# Patient Record
Sex: MALE | Race: WHITE | NOT HISPANIC OR LATINO | Employment: FULL TIME | URBAN - METROPOLITAN AREA
[De-identification: names, ages, dates, MRNs, and addresses within clinical notes are randomized per-mention and may not be internally consistent; named-entity substitution may affect disease eponyms.]

---

## 2022-04-07 ENCOUNTER — OFFICE VISIT (OUTPATIENT)
Dept: FAMILY MEDICINE CLINIC | Facility: CLINIC | Age: 27
End: 2022-04-07

## 2022-04-07 ENCOUNTER — PATIENT ROUNDING (BHMG ONLY) (OUTPATIENT)
Dept: FAMILY MEDICINE CLINIC | Facility: CLINIC | Age: 27
End: 2022-04-07

## 2022-04-07 VITALS
BODY MASS INDEX: 20.33 KG/M2 | HEIGHT: 73 IN | WEIGHT: 153.4 LBS | HEART RATE: 80 BPM | SYSTOLIC BLOOD PRESSURE: 140 MMHG | RESPIRATION RATE: 14 BRPM | TEMPERATURE: 97.3 F | DIASTOLIC BLOOD PRESSURE: 88 MMHG | OXYGEN SATURATION: 96 %

## 2022-04-07 DIAGNOSIS — F41.1 GAD (GENERALIZED ANXIETY DISORDER): ICD-10-CM

## 2022-04-07 DIAGNOSIS — F33.0 MILD EPISODE OF RECURRENT DEPRESSIVE DISORDER: ICD-10-CM

## 2022-04-07 DIAGNOSIS — F42.2 MIXED OBSESSIONAL THOUGHTS AND ACTS: ICD-10-CM

## 2022-04-07 DIAGNOSIS — R03.0 ELEVATED BLOOD PRESSURE READING: Primary | ICD-10-CM

## 2022-04-07 PROCEDURE — 99204 OFFICE O/P NEW MOD 45 MIN: CPT | Performed by: FAMILY MEDICINE

## 2022-04-07 RX ORDER — SERTRALINE HYDROCHLORIDE 25 MG/1
25 TABLET, FILM COATED ORAL DAILY
Qty: 30 TABLET | Refills: 1 | Status: SHIPPED | OUTPATIENT
Start: 2022-04-07 | End: 2022-05-12 | Stop reason: SDUPTHER

## 2022-04-07 NOTE — PROGRESS NOTES
Chief Complaint  Establish Care, chronic nausea x 3 months, and Anxiety    Subjective          Ramses Nguyen presents to [facility] PRIMARY [facility]  Anxiety  Presents for initial visit. Symptoms include depressed mood, excessive worry, nausea and nervous/anxious behavior. Patient reports no irritability, restlessness or suicidal ideas.       GI Problem  The primary symptoms include nausea. The illness began more than 7 days ago.     PHQ-2/PHQ-9 Depression Screening 2022   Little Interest or Pleasure in Doing Things 0-->not at all   Feeling Down, Depressed or Hopeless 3-->nearly every day   Trouble Falling or Staying Asleep, or Sleeping Too Much 1-->several days   Feeling Tired or Having Little Energy 1-->several days   Poor Appetite or Overeating 1-->several days   Feeling Bad about Yourself - or that You are a Failure or Have Let Yourself or Your Family Down 1-->several days   Trouble Concentrating on Things, Such as Reading the Newspaper or Watching Television 0-->not at all   Moving or Speaking So Slowly that Other People Could Have Noticed? Or the Opposite - Being So Fidgety 0-->not at all   Thoughts that You Would be Better Off Dead or of Hurting Yourself in Some Way 0-->not at all   PHQ-9: Brief Depression Severity Measure Score 7   If You Checked Off Any Problems, How Difficult Have These Problems Made It For You to Do Your Work, Take Care of Things at Home, or Get Along with Other People? somewhat difficult     MENDEL-7  Over the last two weeks, how often have you been bothered by the following problems?  Feeling nervous, anxious or on edge: 3  Not being able to stop or control worryin  Worrying too much about different things: 3  Trouble Relaxin  Being so restless that it is hard to sit still: 0  Becoming easily annoyed or irritable: 0  Feeling afraid as if something awful might happen: 3  MENDEL 7 Total Score: 11  If you checked any problems, how difficult have these problems made it for  "you to do your work, take care of things at home, or get along with other people: Somewhat difficult      Anxiety since his father passed away. He has been to counseling but was ineffective. Never on medications. Sometimes he has trouble sleeping, he gets nausea, and worked up. He gets stressed. Every now and then he has to leave work to check on doors locked, didn't leave stove on. Worried about getting fired at work. This morning he locked the door, jiggled handles several times, walked outside, then immediately walked back to check lock a second time. He fidgets his hands a lot a pretty bad tremors. Mother was on medications but it made her depressed. He is scared of medications. He sees counselor with [facility].     Nausea comes and goes by midday. Even if he hasn't eaten. He doesn't want to eat because he is nauseous.             Objective   Vital Signs:   /88   Pulse 80   Temp 97.3 °F (36.3 °C) (Infrared)   Resp 14   Ht 185.4 cm (73\")   Wt 69.6 kg (153 lb 6.4 oz)   SpO2 96%   BMI 20.24 kg/m²     BMI is within normal parameters. No follow-up required.      Physical Exam  Vitals reviewed.   Constitutional:       General: He is not in acute distress.     Appearance: He is not ill-appearing.   Cardiovascular:      Rate and Rhythm: Normal rate and regular rhythm.   Pulmonary:      Effort: Pulmonary effort is normal.      Breath sounds: Normal breath sounds.   Abdominal:      General: Bowel sounds are normal. There is no distension.      Palpations: Abdomen is soft. There is no hepatomegaly.      Tenderness: There is no abdominal tenderness. There is no guarding or rebound.   Neurological:      Mental Status: He is alert.   Psychiatric:         Attention and Perception: Attention normal.         Mood and Affect: Mood is anxious.         Behavior: Behavior is cooperative.        Result Review :                 Assessment and Plan    Diagnoses and all orders for this visit:    1. Elevated blood pressure " reading (Primary)  New.  No history of hypertension.  Possibly related to anxiety.  Plan to recheck at follow-up visit and defer medication.  2. MENDEL (generalized anxiety disorder)  -     sertraline (ZOLOFT) 25 MG tablet; Take 1 tablet by mouth Daily.  Dispense: 30 tablet; Refill: 1  New. Start SSRI.  Advised may take 4-6 weeks to notice an effect.  Discussed potential side effects including headache, dizziness, GI symptoms, sexual side effects,  worsening mood or suicidal ideations.  Patient advised to call the office if develops any side effects or has questions. Reassess in one month.   Continue with counseling through [facility].  Nausea could be somatic symptom of increased anxiety but will need to reevaluate at next visit.  3. Mixed obsessional thoughts and acts  -     sertraline (ZOLOFT) 25 MG tablet; Take 1 tablet by mouth Daily.  Dispense: 30 tablet; Refill: 1  New. Start SSRI.  Advised may take 4-6 weeks to notice an effect.  Discussed potential side effects including headache, dizziness, GI symptoms, sexual side effects,  worsening mood or suicidal ideations.  Patient advised to call the office if develops any side effects or has questions. Reassess in one month.   Continue with counseling through [facility].  4. Mild episode of recurrent depressive disorder (HCC)  -     sertraline (ZOLOFT) 25 MG tablet; Take 1 tablet by mouth Daily.  Dispense: 30 tablet; Refill: 1  New. Start SSRI.  Advised may take 4-6 weeks to notice an effect.  Discussed potential side effects including headache, dizziness, GI symptoms, sexual side effects,  worsening mood or suicidal ideations.  Patient advised to call the office if develops any side effects or has questions. Reassess in one month.   Continue with counseling through [facility].      Follow Up   Return in about 1 month (around 5/7/2022) for Office visit anxiety, OCD, depression .  Patient was given instructions and counseling regarding his condition or for health maintenance  advice. Please see specific information pulled into the AVS if appropriate.     Electronically signed by Darleen Ferris MD, 04/07/22, 9:30 AM EDT.

## 2022-05-12 ENCOUNTER — OFFICE VISIT (OUTPATIENT)
Dept: FAMILY MEDICINE CLINIC | Facility: CLINIC | Age: 27
End: 2022-05-12

## 2022-05-12 VITALS
RESPIRATION RATE: 16 BRPM | HEIGHT: 73 IN | OXYGEN SATURATION: 98 % | WEIGHT: 151 LBS | BODY MASS INDEX: 20.01 KG/M2 | DIASTOLIC BLOOD PRESSURE: 86 MMHG | TEMPERATURE: 98.2 F | HEART RATE: 82 BPM | SYSTOLIC BLOOD PRESSURE: 112 MMHG

## 2022-05-12 DIAGNOSIS — F33.0 MILD EPISODE OF RECURRENT DEPRESSIVE DISORDER: ICD-10-CM

## 2022-05-12 DIAGNOSIS — F42.2 MIXED OBSESSIONAL THOUGHTS AND ACTS: ICD-10-CM

## 2022-05-12 DIAGNOSIS — F41.1 GAD (GENERALIZED ANXIETY DISORDER): ICD-10-CM

## 2022-05-12 PROCEDURE — 99214 OFFICE O/P EST MOD 30 MIN: CPT | Performed by: FAMILY MEDICINE

## 2022-05-12 RX ORDER — SERTRALINE HYDROCHLORIDE 25 MG/1
TABLET, FILM COATED ORAL
Qty: 60 TABLET | Refills: 1 | Status: SHIPPED | OUTPATIENT
Start: 2022-05-12 | End: 2022-08-22

## 2022-05-12 NOTE — PROGRESS NOTES
Chief Complaint  Depression (Medication was working for a little bit but then stopped working so he quit taking it. He has been off his medication for about a week and a half, nausea and hand tremors came back )    Subjective          Ramses Nguyen presents to [facility]  History of Present Illness    He noticed the first week and half on zoloft he was fine. Then he felt it wasn't working anymore and chose to stop the medication. He didn't have side effects. He continues to have bad nausea. It improved but returned. Since age 17 his hands have been shaky and its noticeable. Tremors stopped while on medication. Intermittent shaking. He has no appetite anymore. He knows he needs to eat, but thinking about food he is not interested. He tries to eat at least twice a day. No breakfast. No snacking. No heartburn. He is tried all the time. He doesn't really ever want to do things. He is at work 9 am till midnight sometimes. He goes straight to bed but he can't go to sleep.         PHQ-2/PHQ-9 Depression Screening 5/12/2022   Little Interest or Pleasure in Doing Things 1-->several days   Feeling Down, Depressed or Hopeless 3-->nearly every day   Trouble Falling or Staying Asleep, or Sleeping Too Much 1-->several days   Feeling Tired or Having Little Energy 3-->nearly every day   Poor Appetite or Overeating 2-->more than half the days   Feeling Bad about Yourself - or that You are a Failure or Have Let Yourself or Your Family Down 3-->nearly every day   Trouble Concentrating on Things, Such as Reading the Newspaper or Watching Television 0-->not at all   Moving or Speaking So Slowly that Other People Could Have Noticed? Or the Opposite - Being So Fidgety 0-->not at all   Thoughts that You Would be Better Off Dead or of Hurting Yourself in Some Way 0-->not at all   PHQ-9: Brief Depression Severity Measure Score 13   If You Checked Off Any Problems, How Difficult Have These Problems Made It For You  "to Do Your Work, Take Care of Things at Home, or Get Along with Other People? somewhat difficult       Objective   Vital Signs:  /86 (BP Location: Left arm, Patient Position: Sitting, Cuff Size: Adult)   Pulse 82   Temp 98.2 °F (36.8 °C) (Infrared)   Resp 16   Ht 185.4 cm (72.99\")   Wt 68.5 kg (151 lb)   SpO2 98%   BMI 19.93 kg/m²     BMI is within normal parameters. No follow-up required.      Physical Exam  Vitals reviewed.   Constitutional:       General: He is not in acute distress.     Appearance: He is not ill-appearing.   Cardiovascular:      Rate and Rhythm: Normal rate and regular rhythm.   Pulmonary:      Effort: Pulmonary effort is normal.      Breath sounds: Normal breath sounds.   Neurological:      Mental Status: He is alert.      Motor: No tremor.   Psychiatric:         Attention and Perception: Attention normal.         Mood and Affect: Mood is anxious and depressed. Affect is not tearful.         Behavior: Behavior is cooperative.         Thought Content: Thought content does not include suicidal ideation.         Judgment: Judgment normal.        Result Review :                 Assessment and Plan    Diagnoses and all orders for this visit:    1. MENDEL (generalized anxiety disorder)  -     sertraline (ZOLOFT) 25 MG tablet; Take 1 tablet by mouth Daily for 7 days, THEN 2 tablets Daily for 21 days.  Dispense: 60 tablet; Refill: 1    2. Mixed obsessional thoughts and acts  -     sertraline (ZOLOFT) 25 MG tablet; Take 1 tablet by mouth Daily for 7 days, THEN 2 tablets Daily for 21 days.  Dispense: 60 tablet; Refill: 1    3. Mild episode of recurrent depressive disorder (HCC)  -     sertraline (ZOLOFT) 25 MG tablet; Take 1 tablet by mouth Daily for 7 days, THEN 2 tablets Daily for 21 days.  Dispense: 60 tablet; Refill: 1    Uncontrolled.  Recommend patient to restart sertraline 25 mg for the first week and then increase to 50 mg for the next 3 weeks.  Schedule follow-up visit around a month.  " Discussed with patient may take 4 to 6 weeks to see full impact of medication.  I asked him to contact me if he is experiencing side effects or new symptoms when he increases the dose.  I feel that his symptoms with nausea as well as tremors are related to anxiety and may improve with medication.  Fatigue may be a symptom of his depression which could improve with medication as well.         Follow Up   Return in about 1 month (around 6/12/2022) for Office visit anxiety, depression, tremors.  Patient was given instructions and counseling regarding his condition or for health maintenance advice. Please see specific information pulled into the AVS if appropriate.     Electronically signed by Darleen Ferris MD, 05/12/22, 9:52 AM EDT.

## 2022-08-04 ENCOUNTER — TELEPHONE (OUTPATIENT)
Dept: FAMILY MEDICINE CLINIC | Facility: CLINIC | Age: 27
End: 2022-08-04

## 2022-08-04 NOTE — TELEPHONE ENCOUNTER
Caller: DASHA WONG    Relationship: Emergency Contact    Best call back number: [phone]  What medication are you requesting: RX TO HELP PT SLEEP THROUGHOUT THE NIGHT  What are your current symptoms: NOT ABLE TO SLEEP  How long have you been experiencing symptoms: COUPLE MONTHS    Have you had these symptoms before:    [x] Yes  [] No    Have you been treated for these symptoms before:   [x] Yes  [] No    If a prescription is needed, what is your preferred pharmacy and phone number: [facility] [address] AT [facility] [employer] [address] - [phone]  - [phone] FX     Additional notes:

## 2022-08-04 NOTE — TELEPHONE ENCOUNTER
Caller: DASHA WONG    Relationship: Emergency Contact    Best call back number:[phone]    What medication are you requesting: RX TO HELP NAUSEA  What are your current symptoms:   NAUSEOUS    How long have you been experiencing symptoms:   PAST COUPLE OF MONTHS    Have you had these symptoms before:    [x] Yes  [] No    Have you been treated for these symptoms before:   [] Yes  [x] No    If a prescription is needed, what is your preferred pharmacy and phone number:    [facility] [address] [place] [address] [email or web address] [facility] [address]  - [phone]  - [phone] FX            Additional notes:  REQUEST RX TO HELP WITH NAUSEOUS WHILE EATING

## 2022-08-04 NOTE — TELEPHONE ENCOUNTER
Caller: DASHA WONG    Relationship: Emergency Contact    Best call back number: [phone]    What medications are you currently taking:   Current Outpatient Medications on File Prior to Visit   Medication Sig Dispense Refill   • sertraline (ZOLOFT) 25 MG tablet Take 1 tablet by mouth Daily for 7 days, THEN 2 tablets Daily for 21 days. 60 tablet 1     No current facility-administered medications on file prior to visit.          When did you start taking these medications: FEW MONTHS  Which medication are you concerned about: SERTRALINE    Who prescribed you this medication:  DR. GARLAND    What are your concerns: PT WILL LIKE TO KNOW WHETHER OR NOT PT SHOULD BE ON 1 TABLET OR 2 TABLETS A DAY.

## 2022-08-04 NOTE — TELEPHONE ENCOUNTER
Contacted patient to discuss further, he reports that his nausea has improved but he has been experiencing hand tremors and insomnia. I have scheduled an upcoming appt with PCP

## 2022-08-19 ENCOUNTER — HOSPITAL ENCOUNTER (EMERGENCY)
Facility: HOSPITAL | Age: 27
Discharge: HOME | End: 2022-08-20
Attending: STUDENT IN AN ORGANIZED HEALTH CARE EDUCATION/TRAINING PROGRAM | Admitting: STUDENT IN AN ORGANIZED HEALTH CARE EDUCATION/TRAINING PROGRAM

## 2022-08-19 VITALS
BODY MASS INDEX: 20.01 KG/M2 | RESPIRATION RATE: 18 BRPM | WEIGHT: 151 LBS | SYSTOLIC BLOOD PRESSURE: 138 MMHG | HEIGHT: 73 IN | DIASTOLIC BLOOD PRESSURE: 114 MMHG | HEART RATE: 103 BPM | TEMPERATURE: 98.6 F | OXYGEN SATURATION: 97 %

## 2022-08-19 DIAGNOSIS — R11.2 NAUSEA AND VOMITING, UNSPECIFIED VOMITING TYPE: ICD-10-CM

## 2022-08-19 DIAGNOSIS — F10.10 ALCOHOL ABUSE: Primary | ICD-10-CM

## 2022-08-19 DIAGNOSIS — Z78.9 DRINKING BINGE: ICD-10-CM

## 2022-08-19 LAB
HOLD SPECIMEN: NORMAL
WHOLE BLOOD HOLD COAG: NORMAL
WHOLE BLOOD HOLD SPECIMEN: NORMAL

## 2022-08-19 PROCEDURE — 99283 EMERGENCY DEPT VISIT LOW MDM: CPT

## 2022-08-19 RX ORDER — ONDANSETRON 2 MG/ML
4 INJECTION INTRAMUSCULAR; INTRAVENOUS ONCE
Status: DISCONTINUED | OUTPATIENT
Start: 2022-08-19 | End: 2022-08-20 | Stop reason: HOSPADM

## 2022-08-19 RX ORDER — LIDOCAINE HYDROCHLORIDE 20 MG/ML
15 SOLUTION OROPHARYNGEAL ONCE
Status: COMPLETED | OUTPATIENT
Start: 2022-08-19 | End: 2022-08-19

## 2022-08-19 RX ORDER — ALUMINA, MAGNESIA, AND SIMETHICONE 2400; 2400; 240 MG/30ML; MG/30ML; MG/30ML
15 SUSPENSION ORAL ONCE
Status: COMPLETED | OUTPATIENT
Start: 2022-08-19 | End: 2022-08-19

## 2022-08-19 RX ADMIN — ALUMINUM HYDROXIDE, MAGNESIUM HYDROXIDE, AND DIMETHICONE 15 ML: 400; 400; 40 SUSPENSION ORAL at 23:38

## 2022-08-19 RX ADMIN — LIDOCAINE HYDROCHLORIDE 15 ML: 20 SOLUTION ORAL; TOPICAL at 23:38

## 2022-08-20 RX ORDER — ONDANSETRON 4 MG/1
4 TABLET, ORALLY DISINTEGRATING ORAL 4 TIMES DAILY PRN
Qty: 12 TABLET | Refills: 0 | Status: SHIPPED | OUTPATIENT
Start: 2022-08-20 | End: 2022-08-22 | Stop reason: SDUPTHER

## 2022-08-20 RX ORDER — SUCRALFATE 1 G/1
1 TABLET ORAL 4 TIMES DAILY
Qty: 60 TABLET | Refills: 0 | Status: SHIPPED | OUTPATIENT
Start: 2022-08-20

## 2022-08-20 NOTE — DISCHARGE INSTRUCTIONS
Use the provided nausea medicine to help with symptoms though I suspect your nausea and vomiting is exacerbated by the alcohol use.  Please return to the ED for any concerning symptoms.

## 2022-08-20 NOTE — ED PROVIDER NOTES
EMERGENCY DEPARTMENT ENCOUNTER    Pt Name: Ramses Nguyen  MRN: [id number]  Pt :   1995  Room Number:    Date of encounter:  2022  PCP: Darleen Maravilla MD  ED Provider: Niranjan Armendariz MD    Historian: Patient      HPI:  Chief Complaint: Nausea and vomiting after binge drinking        Context: Ramses Nguyen is a  26-year-old male with history of binge drinking, anxiety, chronic nausea who presents with complaint of nausea and vomiting after a drinking binge.  He says he has been struggling at work lately and the stress causes him to drink heavily as a coping mechanism.  He says he started drinking heavily on Tuesday and over the last few days has consumed 1/5 of hard liquor and a bottle of wine.  His last drink was at 1 PM this afternoon but he continues to have nausea and vomiting.  He is not a daily drinker prior to this but says he has previously thought about cutting back.  He has family history of alcohol abuse and says he is either going to talk to a  or a counselor but is unsure whether he wants to quit or just cut back.  His issue today is the nausea and vomiting and he is hoping for something that will make him feel better.  He denies history of withdrawal or seizures but does state he has a constant tremor that he follows with his PCP for.  Denies fevers or systemic symptoms.  Denies pain only the nausea and vomiting.  No other complaints at this time.    PAST MEDICAL HISTORY  Past Medical History:   Diagnosis Date   • Anxiety    • Depression    • OCD (obsessive compulsive disorder)    • Tremor          PAST SURGICAL HISTORY  Past Surgical History:   Procedure Laterality Date   • APPENDECTOMY     • INCISION AND DRAINAGE ABSCESS      staph infection elbow         FAMILY HISTORY  Family History   Problem Relation Age of Onset   • Hyperlipidemia Father    • Heart disease Father    • Heart attack Father 50   • Other Brother 31        Brain Aneurysm in          SOCIAL  HISTORY  Social History     Socioeconomic History   • Marital status: Single     Spouse name: Rivka Nye   Tobacco Use   • Smoking status: Never Smoker   • Smokeless tobacco: Former User     Types: Chew   Vaping Use   • Vaping Use: Never used   Substance and Sexual Activity   • Alcohol use: Yes   • Drug use: Never   • Sexual activity: Yes     Partners: Female     Birth control/protection: I.U.D.         ALLERGIES  Patient has no known allergies.        REVIEW OF SYSTEMS  Review of Systems       All systems reviewed and negative except for those discussed in HPI.       PHYSICAL EXAM    I have reviewed the triage vital signs and nursing notes.    ED Triage Vitals [08/19/22 1920]   Temp Heart Rate Resp BP SpO2   98.6 °F (37 °C) 103 18 (!) 138/114 97 %      Temp src Heart Rate Source Patient Position BP Location FiO2 (%)   Oral Monitor Sitting Left arm --       Physical Exam  GENERAL:   Appears awake and alert uncomfortable but in no acute distress, clinically sober  HENT: Nares patent.  EYES: No scleral icterus.  CV: Regular rhythm, regular rate.  (Tachycardia from triage seems to have resolved)  RESPIRATORY: Normal effort.  No audible wheezes, rales or rhonchi.  ABDOMEN: Soft, nontender  MUSCULOSKELETAL: No deformities.   NEURO: Alert, moves all extremities, follows commands.  SKIN: Warm, dry, no rash visualized.        LAB RESULTS  Recent Results (from the past 24 hour(s))   Green Top (Gel)    Collection Time: 08/19/22  7:40 PM   Result Value Ref Range    Extra Tube Hold for add-ons.    Lavender Top    Collection Time: 08/19/22  7:40 PM   Result Value Ref Range    Extra Tube hold for add-on    Gold Top - SST    Collection Time: 08/19/22  7:40 PM   Result Value Ref Range    Extra Tube Hold for add-ons.    Gray Top    Collection Time: 08/19/22  7:40 PM   Result Value Ref Range    Extra Tube Hold for add-ons.    Light Blue Top    Collection Time: 08/19/22  7:40 PM   Result Value Ref Range    Extra Tube Hold for  add-ons.        If labs were ordered, I independently reviewed the results.        RADIOLOGY  No Radiology Exams Resulted Within Past 24 Hours    I ordered and reviewed the above noted radiographic studies.        See radiologist's dictation for official interpretation.        PROCEDURES    Procedures    No orders to display       MEDICATIONS GIVEN IN ER    Medications   aluminum-magnesium hydroxide-simethicone (MAALOX MAX) 400-400-40 MG/5ML suspension 15 mL (15 mL Oral Given 8/19/22 5594)   Lidocaine Viscous HCl (XYLOCAINE) 2 % solution 15 mL (15 mL Mouth/Throat Given 8/19/22 2338)         PROGRESS, DATA ANALYSIS, CONSULTS, AND MEDICAL DECISION MAKING    All labs have been independently reviewed by me.  All radiology studies have been reviewed by me and the radiologist dictating the report.   EKG's have been independently viewed and interpreted by me.            ED Course as of 08/20/22 0328   Fri Aug 19, 2022   2219 In summary is a 26-year-old male with history of binge drinking, anxiety, chronic nausea who presents with complaint of nausea and vomiting after a drinking binge.  He says he has been struggling at work lately and the stress causes him to drink heavily as a coping mechanism.  He says he started drinking heavily on Tuesday and over the last few days has consumed 1/5 of hard liquor and a bottle of wine.  His last drink was at 1 PM this afternoon but he continues to have nausea and vomiting.  He is not a daily drinker prior to this but says he has previously thought about cutting back.  He has family history of alcohol abuse and says he is either going to talk to a  or a counselor but is unsure whether he wants to quit or just cut back.  His issue today is the nausea and vomiting and he is hoping for something that will make him feel better.  He denies history of withdrawal or seizures but does state he has a constant tremor that he follows with his PCP for.  Denies fevers or systemic symptoms.   Denies pain only the nausea and vomiting.  No other complaints at this time. [CC]      ED Course User Index  [CC] Niranjan Armendariz MD       He arrived awake and alert initially mildly tachycardic in triage seems to resolve by the time I evaluated him.  He is may be very mildly intoxicated but clinically sober answering all my questions appropriately.  He has been drinking heavily for 3 days but was not drinking prior to that does not have history of withdrawal.  Complains of chronic nausea and vomiting but has been exacerbated by the drinking I think there is at the level of alcoholic gastritis to this.  Otherwise he is well-appearing he does not want treatment at this time says he is going to seek a counselor but is only interested in cutting back at this point.  His main concern right now is the nausea and vomiting.  Treating with IV fluids, Zofran, GI cocktail.  I was notified by nursing that he refused the IV medicines but says he is feeling better after the GI cocktail requesting discharge.  Provided Zofran and Carafate and counseled on avoiding future binge drinking and with his family history of alcoholism I recommended not just cutting back but avoiding alcohol altogether.  Discharged in stable condition.      AS OF 03:28 EDT VITALS:    BP - (!) 138/114  HR - 103  TEMP - 98.6 °F (37 °C) (Oral)  O2 SATS - 97%                  DIAGNOSIS  Final diagnoses:   Alcohol abuse   Drinking binge   Nausea and vomiting, unspecified vomiting type         DISPOSITION  DISCHARGE    Patient discharged in stable condition.    Reviewed implications of results, diagnosis, meds, responsibility to follow up, warning signs and symptoms of possible worsening, potential complications and reasons to return to ER.    Patient/Family voiced understanding of above instructions.    Discussed plan for discharge, as there is no emergent indication for admission.  Pt/family is agreeable and understands need for follow up and possible  repeat testing.  Pt/family is aware that discharge does not mean that nothing is wrong but that it indicates no emergency is currently present that requires admission and they must continue care with follow-up as given below or with a physician of their choice.     FOLLOW-UP  Darleen Maravilla MD  [address] [place] [id number] [phone]    Call            Medication List      New Prescriptions    ondansetron ODT 4 MG disintegrating tablet  Commonly known as: ZOFRAN-ODT  Place 1 tablet on the tongue 4 (Four) Times a Day As Needed for Nausea or Vomiting.     sucralfate 1 g tablet  Commonly known as: CARAFATE  Take 1 tablet by mouth 4 (Four) Times a Day. Thoroughly crush pill and mix in small amount of water prior to swallowing.           Where to Get Your Medications      These medications were sent to [facility] [address] [place] [address] - [facility] [address] AT [facility] [address] - [phone]  - [phone]   [address] [place], [place] [address], [facility] [address]    Phone: [phone]   · ondansetron ODT 4 MG disintegrating tablet  · sucralfate 1 g tablet                    Niranjan Armendariz MD  08/20/22 2580

## 2022-08-22 ENCOUNTER — OFFICE VISIT (OUTPATIENT)
Dept: FAMILY MEDICINE CLINIC | Facility: CLINIC | Age: 27
End: 2022-08-22

## 2022-08-22 VITALS
BODY MASS INDEX: 20.15 KG/M2 | WEIGHT: 152 LBS | DIASTOLIC BLOOD PRESSURE: 78 MMHG | HEIGHT: 73 IN | OXYGEN SATURATION: 93 % | SYSTOLIC BLOOD PRESSURE: 138 MMHG | HEART RATE: 98 BPM | TEMPERATURE: 97.1 F

## 2022-08-22 DIAGNOSIS — F51.01 PRIMARY INSOMNIA: ICD-10-CM

## 2022-08-22 DIAGNOSIS — F42.2 MIXED OBSESSIONAL THOUGHTS AND ACTS: ICD-10-CM

## 2022-08-22 DIAGNOSIS — K29.20 CHRONIC ALCOHOLIC GASTRITIS, PRESENCE OF BLEEDING UNSPECIFIED: ICD-10-CM

## 2022-08-22 DIAGNOSIS — F33.0 MILD EPISODE OF RECURRENT DEPRESSIVE DISORDER: ICD-10-CM

## 2022-08-22 DIAGNOSIS — F41.1 GAD (GENERALIZED ANXIETY DISORDER): Primary | ICD-10-CM

## 2022-08-22 DIAGNOSIS — R25.1 TREMOR: ICD-10-CM

## 2022-08-22 PROCEDURE — 99214 OFFICE O/P EST MOD 30 MIN: CPT | Performed by: FAMILY MEDICINE

## 2022-08-22 RX ORDER — OMEPRAZOLE 20 MG/1
20 CAPSULE, DELAYED RELEASE ORAL
Qty: 30 CAPSULE | Refills: 11 | Status: SHIPPED | OUTPATIENT
Start: 2022-08-22

## 2022-08-22 RX ORDER — TRAZODONE HYDROCHLORIDE 100 MG/1
50-100 TABLET ORAL NIGHTLY
Qty: 30 TABLET | Refills: 11 | Status: SHIPPED | OUTPATIENT
Start: 2022-08-22 | End: 2022-09-22

## 2022-08-22 RX ORDER — ONDANSETRON 4 MG/1
4 TABLET, ORALLY DISINTEGRATING ORAL 4 TIMES DAILY PRN
Qty: 30 TABLET | Refills: 1 | Status: SHIPPED | OUTPATIENT
Start: 2022-08-22

## 2022-08-22 NOTE — PROGRESS NOTES
"Chief Complaint  Anxiety (Has been continuing sertraline and reports that it has helped manage his anxiety symptoms very well/) and Drug / Alcohol Assessment    Subjective        Ramses Nguyen presents to [facility]  History of Present Illness     He is doing a little bit better from ED. Issue is he is not eating. He knows he's hungry but doesn't sound appetizing. He is not sleeping well. \"I need to stop drinking as much as I do.\" He gets nausea as well. Last alcohol drink yesterday 1-2 beers to see if he would eat a little bit. Nausea this morning. \"My brain is telling me if you throw up, you will feel better.\" ED prescribed sucralfate 4 times a day. He is using zofran as needed.     He has been taking zoloft 25mg 1 pill daily. He used to take 2 pills but refill problem at pharmacy.     He is a light sleeper. Difficulty going back to sleep. Animals wake him up. Mother has taken trazodone and he is interested in trying it. He keeps the room cool, weighted blanket, breathing exercises, glass of milk.     He continues to have hand tremors. Onset age 17. Hard to hold something small like a pen, ok with holding a phone.       Objective   Vital Signs:  /78   Pulse 98   Temp 97.1 °F (36.2 °C)   Ht 185.4 cm (72.99\")   Wt 68.9 kg (152 lb)   SpO2 93%   BMI 20.06 kg/m²   Estimated body mass index is 20.06 kg/m² as calculated from the following:    Height as of this encounter: 185.4 cm (72.99\").    Weight as of this encounter: 68.9 kg (152 lb).    BMI is within normal parameters. No other follow-up for BMI required.      Physical Exam  Vitals reviewed.   Constitutional:       General: He is not in acute distress.     Appearance: He is not ill-appearing.   Cardiovascular:      Rate and Rhythm: Normal rate and regular rhythm.   Pulmonary:      Effort: Pulmonary effort is normal.      Breath sounds: Normal breath sounds.   Abdominal:      General: Bowel sounds are normal. There is no " distension.      Palpations: Abdomen is soft.      Tenderness: There is no abdominal tenderness. There is no guarding.   Neurological:      Mental Status: He is alert.      Motor: No tremor.   Psychiatric:         Attention and Perception: Attention normal.         Mood and Affect: Mood is depressed.         Behavior: Behavior is cooperative.        Result Review :               ED with Niranjan Armendariz MD (08/19/2022)    Assessment and Plan {CC Problem List  Visit Diagnosis   ROS  Review (Popup)  [facility] Maintenance  Quality  BestPractice  Medications  SmartSets  SnapShot Encounters  Media :23}  Diagnoses and all orders for this visit:    1. MENDEL (generalized anxiety disorder) (Primary)  -     sertraline (ZOLOFT) 50 MG tablet; Take 1 tablet by mouth Daily.  Dispense: 30 tablet; Refill: 11  Increase Zoloft to 50 mg.  Reassess next month.  2. Mixed obsessional thoughts and acts  -     sertraline (ZOLOFT) 50 MG tablet; Take 1 tablet by mouth Daily.  Dispense: 30 tablet; Refill: 11  Increase Zoloft to 50 mg.  Reassess next month.  3. Mild episode of recurrent depressive disorder (HCC)  -     sertraline (ZOLOFT) 50 MG tablet; Take 1 tablet by mouth Daily.  Dispense: 30 tablet; Refill: 11  Increase Zoloft to 50 mg.  Reassess next month.  4. Chronic alcoholic gastritis, presence of bleeding unspecified  -     omeprazole (priLOSEC) 20 MG capsule; Take 1 capsule by mouth Every Morning Before Breakfast.  Dispense: 30 capsule; Refill: 11  -     ondansetron ODT (ZOFRAN-ODT) 4 MG disintegrating tablet; Place 1 tablet on the tongue 4 (Four) Times a Day As Needed for Nausea or Vomiting.  Dispense: 30 tablet; Refill: 1  New.  Recommend PPI.  Zofran for short-term use.  He is also prescribed Carafate by the emergency department for 30 days.  5. Primary insomnia  -     traZODone (DESYREL) 100 MG tablet; Take 0.5-1 tablets by mouth Every Night.  Dispense: 30 tablet; Refill: 11  New.  Start with trazodone 1/2 pill and  may increase to 1 pill if needed for sleep.  6. Tremor  -     Ambulatory Referral to Neurology  Establish care with neurology for further evaluation.           Follow Up   Return in about 1 month (around 9/22/2022) for Office visit anxiety, depression, insomnia.  Patient was given instructions and counseling regarding his condition or for health maintenance advice. Please see specific information pulled into the AVS if appropriate.     Electronically signed by Darleen Ferris MD, 08/22/22, 1:01 PM EDT.

## 2022-08-23 ENCOUNTER — OFFICE VISIT (OUTPATIENT)
Dept: NEUROLOGY | Facility: CLINIC | Age: 27
End: 2022-08-23

## 2022-08-23 VITALS
WEIGHT: 153 LBS | HEIGHT: 73 IN | HEART RATE: 90 BPM | SYSTOLIC BLOOD PRESSURE: 128 MMHG | BODY MASS INDEX: 20.28 KG/M2 | OXYGEN SATURATION: 96 % | DIASTOLIC BLOOD PRESSURE: 88 MMHG | RESPIRATION RATE: 16 BRPM

## 2022-08-23 DIAGNOSIS — R25.1 TREMOR: Primary | ICD-10-CM

## 2022-08-23 PROCEDURE — 99204 OFFICE O/P NEW MOD 45 MIN: CPT | Performed by: PSYCHIATRY & NEUROLOGY

## 2022-08-23 RX ORDER — PROPRANOLOL HYDROCHLORIDE 20 MG/1
20 TABLET ORAL 2 TIMES DAILY
Qty: 60 TABLET | Refills: 5 | Status: SHIPPED | OUTPATIENT
Start: 2022-08-23 | End: 2022-09-27

## 2022-08-23 NOTE — PROGRESS NOTES
Subjective:    CC: Ramses Nguyen is seen today in consultation at the request of  for Tremors (Bilateral Hand Tremors)        HPI:  26-year-old male with a history of anxiety, depression presents with tremors.  As per patient he started having tremors in his hands at age 16 but over the years they have worsened in severity as they are almost constant now either at rest or while carrying out tasks such as writing and holding smaller objects in his hands.  He denies having any family history of tremors but there is a history of Parkinson's in his grandmother and regular tremors in his maternal grandfather.  Stress and anxiety does make the tremors worse.  He denies having any heavy alcohol use in the past except in the past year which he attributes to a stressful job.  In fact a few days ago he went to the ER for nausea and vomiting after a bout of heavy drinking.  Has decided to cut back on it.  He has not noticed any improvement/worsening in his tremors with alcohol.  He was started on Zoloft for his mood a few years ago that was recently increased.  Was also put on trazodone for his sleep yesterday that he is yet to start.  Of note-patient's brother was diagnosed with a cerebral AVM after he passed out.  Patient himself denies any significant symptoms such as severe headaches.    The following portions of the patient's history were reviewed today and updated as of 08/23/2022  : allergies, current medications, past family history, past medical history, past social history, past surgical history and problem list  These document will be scanned to patient's chart.      Current Outpatient Medications:   •  omeprazole (priLOSEC) 20 MG capsule, Take 1 capsule by mouth Every Morning Before Breakfast., Disp: 30 capsule, Rfl: 11  •  ondansetron ODT (ZOFRAN-ODT) 4 MG disintegrating tablet, Place 1 tablet on the tongue 4 (Four) Times a Day As Needed for Nausea or Vomiting., Disp: 30 tablet, Rfl: 1  •  sertraline  "(ZOLOFT) 50 MG tablet, Take 1 tablet by mouth Daily., Disp: 30 tablet, Rfl: 11  •  sucralfate (CARAFATE) 1 g tablet, Take 1 tablet by mouth 4 (Four) Times a Day. Thoroughly crush pill and mix in small amount of water prior to swallowing., Disp: 60 tablet, Rfl: 0  •  traZODone (DESYREL) 100 MG tablet, Take 0.5-1 tablets by mouth Every Night., Disp: 30 tablet, Rfl: 11  •  propranolol (INDERAL) 20 MG tablet, Take 1 tablet by mouth 2 (Two) Times a Day., Disp: 60 tablet, Rfl: 5   Past Medical History:   Diagnosis Date   • Anxiety    • CTS (carpal tunnel syndrome)    • Depression    • OCD (obsessive compulsive disorder)    • Tremor       Past Surgical History:   Procedure Laterality Date   • APPENDECTOMY     • INCISION AND DRAINAGE ABSCESS      staph infection elbow      Family History   Problem Relation Age of Onset   • Hyperlipidemia Father    • Heart disease Father    • Heart attack Father 50   • Other Brother 31        Brain Aneurysm in 2020      Social History     Socioeconomic History   • Marital status: Single     Spouse name: Rivka Nye   Tobacco Use   • Smoking status: Never Smoker   • Smokeless tobacco: Former User     Types: Chew   Vaping Use   • Vaping Use: Never used   Substance and Sexual Activity   • Alcohol use: Yes   • Drug use: Never   • Sexual activity: Yes     Partners: Female     Birth control/protection: I.U.D.     Review of Systems   Neurological: Positive for tremors.   All other systems reviewed and are negative.      Objective:    /88   Pulse 90   Resp 16   Ht 185.4 cm (73\")   Wt 69.4 kg (153 lb)   SpO2 96%   BMI 20.19 kg/m²     Neurology Exam:    General apperance: NAD.     Mental status: Alert, awake and oriented to time place and person.    Recent and Remote memory: Intact.    Attention span and Concentration: Normal.     Language and Speech: Intact- No dysarthria.    Fluency, Naming , Repitition and Comprehension:  Intact    Cranial Nerves:   CN II: Visual fields are full. " Intact.  Pupils - BLU  CN III, IV and VI: Extraocular movements are intact. Normal saccades.   CN V: Facial sensation is intact.   CN VII: Muscles of facial expression reveal no asymmetry. Intact.   CN VIII: Hearing is intact. Whispered voice intact.   CN IX and X: Palate elevates symmetrically. Intact  CN XI: Shoulder shrug is intact.   CN XII: Tongue is midline without evidence of atrophy or fasciculation.     Ophthalmoscopic exam of optic disc- deferred    Motor: Extremely low amplitude postural and kinetic tremors noted in the left more than right hand.  He did have a mild tremor while drawing a Mar's wheel and while writing.  No micrographia noted    Right UE muscle strength 5/5. Normal tone.     Left UE muscle strength 5/5. Normal tone.      Right LE muscle strength5/5. Normal tone.     Left LE muscle strength 5/5. Normal tone.      Sensory: Normal light touch, vibration and pinprick sensation bilaterally.    DTRs: 2+ bilaterally in upper and lower extremities.    Babinski: Negative bilaterally.    Co-ordination: Normal finger-to-nose, heel to shin B/L.    Rhomberg: Negative.    Gait: Normal.    Cardiovascular: Regular rate and rhythm without murmur, gallop or rub.    Assessment and Plan:  1. Tremor  He could have either  physiological tremors worsened by anxiety or mild essential tremors  I will start him on propranolol gradually increasing to 20 mg twice a day that will hopefully help with both tremors and anxiety  I will also check blood work including a vitamin B12 level, thyroid function test, thiamine level and CBC    - Vitamin B12; Future  - TSH+Free T4; Future  - Vitamin B1, Whole Blood; Future  -CBC    Return in about 2 months (around 10/23/2022).     I spent over 35 minutes with the patient face to face out of which over 50% (30 minutes) was spent in management, instructions and education.     Rylee Esteves MD

## 2022-09-08 ENCOUNTER — LAB (OUTPATIENT)
Dept: LAB | Facility: HOSPITAL | Age: 27
End: 2022-09-08

## 2022-09-08 DIAGNOSIS — R25.1 TREMOR: ICD-10-CM

## 2022-09-08 PROCEDURE — 84443 ASSAY THYROID STIM HORMONE: CPT

## 2022-09-08 PROCEDURE — 82607 VITAMIN B-12: CPT

## 2022-09-08 PROCEDURE — 85027 COMPLETE CBC AUTOMATED: CPT

## 2022-09-08 PROCEDURE — 36415 COLL VENOUS BLD VENIPUNCTURE: CPT

## 2022-09-08 PROCEDURE — 84439 ASSAY OF FREE THYROXINE: CPT

## 2022-09-08 PROCEDURE — 84425 ASSAY OF VITAMIN B-1: CPT

## 2022-09-09 LAB
DEPRECATED RDW RBC AUTO: 50.9 FL (ref 37–54)
ERYTHROCYTE [DISTWIDTH] IN BLOOD BY AUTOMATED COUNT: 13.7 % (ref 12.3–15.4)
HCT VFR BLD AUTO: 41.4 % (ref 37.5–51)
HGB BLD-MCNC: 14.3 G/DL (ref 13–17.7)
MCH RBC QN AUTO: 35.8 PG (ref 26.6–33)
MCHC RBC AUTO-ENTMCNC: 34.5 G/DL (ref 31.5–35.7)
MCV RBC AUTO: 103.5 FL (ref 79–97)
PLATELET # BLD AUTO: 283 10*3/MM3 (ref 140–450)
PMV BLD AUTO: 9.2 FL (ref 6–12)
RBC # BLD AUTO: 4 10*6/MM3 (ref 4.14–5.8)
T4 FREE SERPL-MCNC: 1.02 NG/DL (ref 0.93–1.7)
TSH SERPL DL<=0.05 MIU/L-ACNC: 1.01 UIU/ML (ref 0.27–4.2)
VIT B12 BLD-MCNC: 822 PG/ML (ref 211–946)
WBC NRBC COR # BLD: 3.45 10*3/MM3 (ref 3.4–10.8)

## 2022-09-12 LAB — VIT B1 BLD-SCNC: 106.8 NMOL/L (ref 66.5–200)

## 2022-09-22 ENCOUNTER — OFFICE VISIT (OUTPATIENT)
Dept: FAMILY MEDICINE CLINIC | Facility: CLINIC | Age: 27
End: 2022-09-22

## 2022-09-22 VITALS
OXYGEN SATURATION: 99 % | WEIGHT: 158.2 LBS | SYSTOLIC BLOOD PRESSURE: 124 MMHG | HEIGHT: 73 IN | BODY MASS INDEX: 20.97 KG/M2 | DIASTOLIC BLOOD PRESSURE: 88 MMHG | HEART RATE: 83 BPM

## 2022-09-22 DIAGNOSIS — F41.1 GAD (GENERALIZED ANXIETY DISORDER): ICD-10-CM

## 2022-09-22 DIAGNOSIS — F42.2 MIXED OBSESSIONAL THOUGHTS AND ACTS: ICD-10-CM

## 2022-09-22 DIAGNOSIS — F51.01 PRIMARY INSOMNIA: Primary | ICD-10-CM

## 2022-09-22 DIAGNOSIS — F33.0 MILD EPISODE OF RECURRENT DEPRESSIVE DISORDER: ICD-10-CM

## 2022-09-22 DIAGNOSIS — R25.1 TREMOR: ICD-10-CM

## 2022-09-22 DIAGNOSIS — K29.20 CHRONIC ALCOHOLIC GASTRITIS, PRESENCE OF BLEEDING UNSPECIFIED: ICD-10-CM

## 2022-09-22 PROCEDURE — 99214 OFFICE O/P EST MOD 30 MIN: CPT | Performed by: FAMILY MEDICINE

## 2022-09-22 RX ORDER — AMITRIPTYLINE HYDROCHLORIDE 25 MG/1
25-75 TABLET, FILM COATED ORAL NIGHTLY
Qty: 90 TABLET | Refills: 11 | Status: SHIPPED | OUTPATIENT
Start: 2022-09-22 | End: 2022-09-26

## 2022-09-22 NOTE — PROGRESS NOTES
"Chief Complaint  Anxiety, Depression, and Insomnia    Subjective        Ramses Nguyen presents to [facility]  Anxiety  Presents for follow-up visit. Symptoms include insomnia.     His past medical history is significant for depression.   Depression  Visit Type: follow-up  Patient presents with the following symptoms: insomnia.    Insomnia  This is a recurrent problem.     The best he has felt in years. He had to quit his job and is unemployed. Moving to [place] soon. Anxiety medication is doing great. Tremors helps but he still shakes. He is eating and enjoying food. He doesn't feel nausea. When he stopped prilosec his body felt different. He is still not sleeping. Trazodone didn't help. Light sleeper. 6 hours of sleep in past 72 hours. He wants to sleep and can't fall asleep. He is trying to cope with alcohol.  He drank over the weekend but not currently.        Objective   Vital Signs:  /88   Pulse 83   Ht 185.4 cm (72.99\")   Wt 71.8 kg (158 lb 3.2 oz)   SpO2 99%   BMI 20.88 kg/m²   Estimated body mass index is 20.88 kg/m² as calculated from the following:    Height as of this encounter: 185.4 cm (72.99\").    Weight as of this encounter: 71.8 kg (158 lb 3.2 oz).    BMI is within normal parameters. No other follow-up for BMI required.      Physical Exam  Vitals reviewed.   Constitutional:       General: He is not in acute distress.     Appearance: He is not ill-appearing.   Cardiovascular:      Rate and Rhythm: Normal rate and regular rhythm.   Pulmonary:      Effort: Pulmonary effort is normal.      Breath sounds: Normal breath sounds.   Neurological:      Mental Status: He is alert.      Comments: Mild hand tremors   Psychiatric:         Mood and Affect: Mood and affect normal.         Speech: Speech normal.         Behavior: Behavior normal. Behavior is cooperative.        Result Review :               Vitamin B12 (09/08/2022 12:22)  TSH+Free T4 (09/08/2022 " 12:22)  Vitamin B1, Whole Blood (09/08/2022 12:22)  CBC (No Diff) (09/08/2022 12:22)    Assessment and Plan   Diagnoses and all orders for this visit:    1. Primary insomnia (Primary)  -     amitriptyline (ELAVIL) 25 MG tablet; Take 1-3 tablets by mouth Every Night.  Dispense: 90 tablet; Refill: 11  He did not have improvement with trazodone.  At this time discussed amitriptyline and titrate to effect.  Also discussed interaction with other medications and monitoring for signs of serotonin syndrome.  2. Tremor  Continue propranolol.  3. Chronic alcoholic gastritis, presence of bleeding unspecified  Continue omeprazole.  Also recommend continuing counseling for ongoing coping skills to avoid turning to alcohol.  4. MENDEL (generalized anxiety disorder)  Improved.  Continue Zoloft.  5. Mixed obsessional thoughts and acts  Improved.  Continue Zoloft.  6. Mild episode of recurrent depressive disorder (HCC)  Proved.  Continue Zoloft.     He will be transferring to new primary care upon moving to [place].  Advised that I will be able to see him virtually while he is in [place] if he needs care before seeing a new doctor.         Follow Up   Return if symptoms worsen or fail to improve.  Patient was given instructions and counseling regarding his condition or for health maintenance advice. Please see specific information pulled into the AVS if appropriate.     Electronically signed by Darleen Ferris MD, 09/22/22, 12:32 PM EDT.

## 2022-09-26 ENCOUNTER — TELEPHONE (OUTPATIENT)
Dept: FAMILY MEDICINE CLINIC | Facility: CLINIC | Age: 27
End: 2022-09-26

## 2022-09-26 RX ORDER — HYDROXYZINE 50 MG/1
25-50 TABLET, FILM COATED ORAL NIGHTLY PRN
Qty: 90 TABLET | Refills: 1 | Status: SHIPPED | OUTPATIENT
Start: 2022-09-26

## 2022-09-26 NOTE — TELEPHONE ENCOUNTER
Attempted to contact, no answer. Left detailed voicemail relaying provider's message     Stop amitriptyline.  New prescription for hydroxyzine 1/2 to 1 pill to take at night for sleep.  Hub can relay and document.

## 2022-09-26 NOTE — TELEPHONE ENCOUNTER
Caller: DASHA WONG    Relationship: Emergency Contact    Best call back number: [phone]    What medications are you currently taking:   Current Outpatient Medications on File Prior to Visit   Medication Sig Dispense Refill   • amitriptyline (ELAVIL) 25 MG tablet Take 1-3 tablets by mouth Every Night. 90 tablet 11   • omeprazole (priLOSEC) 20 MG capsule Take 1 capsule by mouth Every Morning Before Breakfast. 30 capsule 11   • ondansetron ODT (ZOFRAN-ODT) 4 MG disintegrating tablet Place 1 tablet on the tongue 4 (Four) Times a Day As Needed for Nausea or Vomiting. 30 tablet 1   • propranolol (INDERAL) 20 MG tablet Take 1 tablet by mouth 2 (Two) Times a Day. 60 tablet 5   • sertraline (ZOLOFT) 50 MG tablet Take 1 tablet by mouth Daily. 30 tablet 11   • sucralfate (CARAFATE) 1 g tablet Take 1 tablet by mouth 4 (Four) Times a Day. Thoroughly crush pill and mix in small amount of water prior to swallowing. 60 tablet 0     No current facility-administered medications on file prior to visit.      When did you start taking these medications: THIS Thursday 09/22/22    Which medication are you concerned about: AMITRIPTYLINE    Who prescribed you this medication: DR MARTINEZ    What are your concerns: HALLUCINATIONS, SEEING PEOPLE AND HEARING VOICES. CANNOT SLEEP.

## 2022-09-26 NOTE — TELEPHONE ENCOUNTER
Attempted to contact pt, no answer. LVM for return call (office # given)    Hub may relay message and schedule appointment.    Darleen Maravilla MD 2 hours ago (10:55 AM)        Stop amitriptyline.  New prescription for hydroxyzine 1/2 to 1 pill to take at night for sleep.

## 2022-09-27 ENCOUNTER — OFFICE VISIT (OUTPATIENT)
Dept: NEUROLOGY | Facility: CLINIC | Age: 27
End: 2022-09-27

## 2022-09-27 VITALS
WEIGHT: 159 LBS | DIASTOLIC BLOOD PRESSURE: 70 MMHG | SYSTOLIC BLOOD PRESSURE: 126 MMHG | OXYGEN SATURATION: 98 % | BODY MASS INDEX: 20.98 KG/M2 | HEART RATE: 91 BPM

## 2022-09-27 DIAGNOSIS — R25.1 TREMOR: Primary | ICD-10-CM

## 2022-09-27 PROCEDURE — 99213 OFFICE O/P EST LOW 20 MIN: CPT | Performed by: PSYCHIATRY & NEUROLOGY

## 2022-09-27 RX ORDER — PROPRANOLOL HYDROCHLORIDE 40 MG/1
40 TABLET ORAL 2 TIMES DAILY
Qty: 60 TABLET | Refills: 5 | Status: SHIPPED | OUTPATIENT
Start: 2022-09-27

## 2022-09-27 NOTE — PROGRESS NOTES
Subjective:    CC: Ramses Nguyen is seen today  for Tremors (Bilateral Hand Tremors)        Current visit- he started taking propranolol 20 mg twice a day for his tremors which has helped some but that tremors have not completely subsided.  Denies any side effects.  His anxiety has improved though since he quit his previous job and will be moving to [facility] for a different one.  His blood work including thiamine level, B12 and thyroid function test was normal.  For his sleep he was started on trazodone which did not help.  After that he was put on amitriptyline but on the third night he took a dose of 75 mg nightly which caused him to have visual hallucinations hence he stopped taking it.  He has recently been put on hydroxyzine that he is yet to start.    Initial ddzre-38-anol-old male with a history of anxiety, depression presents with tremors.  As per patient he started having tremors in his hands at age 16 but over the years they have worsened in severity as they are almost constant now either at rest or while carrying out tasks such as writing and holding smaller objects in his hands.  He denies having any family history of tremors but there is a history of Parkinson's in his grandmother and regular tremors in his maternal grandfather.  Stress and anxiety does make the tremors worse.  He denies having any heavy alcohol use in the past except in the past year which he attributes to a stressful job.  In fact a few days ago he went to the ER for nausea and vomiting after a bout of heavy drinking.  Has decided to cut back on it.  He has not noticed any improvement/worsening in his tremors with alcohol.  He was started on Zoloft for his mood a few years ago that was recently increased.  Was also put on trazodone for his sleep yesterday that he is yet to start.  Of note-patient's brother was diagnosed with a cerebral AVM after he passed out.  Patient himself denies any significant symptoms such as severe  headaches.    The following portions of the patient's history were reviewed today and updated as of 08/23/2022  : allergies, current medications, past family history, past medical history, past social history, past surgical history and problem list  These document will be scanned to patient's chart.      Current Outpatient Medications:   •  hydrOXYzine (ATARAX) 50 MG tablet, Take 0.5-1 tablets by mouth At Night As Needed (sleep)., Disp: 90 tablet, Rfl: 1  •  omeprazole (priLOSEC) 20 MG capsule, Take 1 capsule by mouth Every Morning Before Breakfast., Disp: 30 capsule, Rfl: 11  •  ondansetron ODT (ZOFRAN-ODT) 4 MG disintegrating tablet, Place 1 tablet on the tongue 4 (Four) Times a Day As Needed for Nausea or Vomiting., Disp: 30 tablet, Rfl: 1  •  sertraline (ZOLOFT) 50 MG tablet, Take 1 tablet by mouth Daily., Disp: 30 tablet, Rfl: 11  •  sucralfate (CARAFATE) 1 g tablet, Take 1 tablet by mouth 4 (Four) Times a Day. Thoroughly crush pill and mix in small amount of water prior to swallowing., Disp: 60 tablet, Rfl: 0  •  propranolol (INDERAL) 40 MG tablet, Take 1 tablet by mouth 2 (Two) Times a Day., Disp: 60 tablet, Rfl: 5   Past Medical History:   Diagnosis Date   • Anxiety    • CTS (carpal tunnel syndrome)    • Depression    • OCD (obsessive compulsive disorder)    • Tremor       Past Surgical History:   Procedure Laterality Date   • APPENDECTOMY     • INCISION AND DRAINAGE ABSCESS      staph infection elbow      Family History   Problem Relation Age of Onset   • Hyperlipidemia Father    • Heart disease Father         Heart attack back in 2011   • Heart attack Father 50   • Other Brother 31        Brain Aneurysm in 2020      Social History     Socioeconomic History   • Marital status: Single     Spouse name: Rivka Nye   Tobacco Use   • Smoking status: Never Smoker   • Smokeless tobacco: Former User     Types: Chew   Vaping Use   • Vaping Use: Never used   Substance and Sexual Activity   • Alcohol use: Yes   •  Drug use: Never   • Sexual activity: Yes     Partners: Female     Birth control/protection: I.U.D.     Review of Systems   Neurological: Positive for tremors.   All other systems reviewed and are negative.      Objective:    /70   Pulse 91   Wt 72.1 kg (159 lb)   SpO2 98%   BMI 20.98 kg/m²     Neurology Exam: Reviewed and remains unchanged    General apperance: NAD.     Mental status: Alert, awake and oriented to time place and person.    Recent and Remote memory: Intact.    Attention span and Concentration: Normal.     Language and Speech: Intact- No dysarthria.    Fluency, Naming , Repitition and Comprehension:  Intact    Cranial Nerves:   CN II: Visual fields are full. Intact.  Pupils - BLU  CN III, IV and VI: Extraocular movements are intact. Normal saccades.   CN V: Facial sensation is intact.   CN VII: Muscles of facial expression reveal no asymmetry. Intact.   CN VIII: Hearing is intact. Whispered voice intact.   CN IX and X: Palate elevates symmetrically. Intact  CN XI: Shoulder shrug is intact.   CN XII: Tongue is midline without evidence of atrophy or fasciculation.     Ophthalmoscopic exam of optic disc- deferred    Motor: Extremely low amplitude postural and kinetic tremors noted in the left more than right hand.  He did have a mild tremor while drawing a Mar's wheel and while writing.  No micrographia noted    Right UE muscle strength 5/5. Normal tone.     Left UE muscle strength 5/5. Normal tone.      Right LE muscle strength5/5. Normal tone.     Left LE muscle strength 5/5. Normal tone.      Sensory: Normal light touch, vibration and pinprick sensation bilaterally.    DTRs: 2+ bilaterally in upper and lower extremities.    Babinski: Negative bilaterally.    Co-ordination: Normal finger-to-nose, heel to shin B/L.    Rhomberg: Negative.    Gait: Normal.    Cardiovascular: Regular rate and rhythm without murmur, gallop or rub.    Assessment and Plan:  1. Tremor  He could have either   physiological tremors worsened by anxiety or mild essential tremors  B12 level and thyroid function test was unremarkable  I will increase his propranolol to 40 mg twice a day  He will also be starting hydroxyzine 25-50 mg nightly for his sleep  I have told him to keep himself well-hydrated which she is    Return in about 3 months (around 12/27/2022).         Rylee Esteves MD